# Patient Record
(demographics unavailable — no encounter records)

---

## 2025-04-02 NOTE — REASON FOR VISIT
[Initial - Scheduled] : an initial, scheduled visit with concerns of [Other: ____] : [unfilled] [Patient] : patient [Mother] : mother [Family Member] : family member

## 2025-04-02 NOTE — HISTORY OF PRESENT ILLNESS
[FreeTextEntry1] : Marlee is a 10 year old female with a history of a cloaca s/p colostomy and mucus fistula creation for diversion and vaginostomy for evacuation of urine; surgery done in [place]. She was admitted to [facility] three months ago with abdominal pain. Since then, she has been doing well at home with occasional abdominal pain when she is active, but no significant issues reported. Mom has been managing the ostomy site well at home and denies any issues with the appliance. She is having a good output from the site as well as from the vaginostomy. Mom reports some occasional leakage from the vagina, but not from her anus. Marlee is tolerating her meals well without emesis. To note, Marlee had a VACTERL workup performed in [place] was reportedly normal. She is otherwise healthy and the family presents today to discuss surgical options moving forward.

## 2025-04-02 NOTE — ADDENDUM
[FreeTextEntry1] : Documented by Carlos Buckner acting as a scribe for Dr. Arreguin on 04/02/2025.   All medical record entries made by the Scribe were at my, Dr. Arreguin, direction and personally dictated by me on 04/02/2025. I have reviewed the chart and agree that the record accurately reflects my personal performances of the history, physical exam, assessment and plan. I have also personally directed, reviewed, and agree with the instructions.

## 2025-04-02 NOTE — CONSULT LETTER
[Dear  ___] : Dear  [unfilled], [Consult Letter:] : I had the pleasure of evaluating your patient, [unfilled]. [Please see my note below.] : Please see my note below. [Consult Closing:] : Thank you very much for allowing me to participate in the care of this patient.  If you have any questions, please do not hesitate to contact me. [Sincerely,] : Sincerely, [FreeTextEntry3] : Cy Arreguin MD Division of Pediatric, General, Thoracic and Endoscopic Surgery [facility]

## 2025-04-02 NOTE — ASSESSMENT
[FreeTextEntry1] : Marlee is a 10 year old female with a history of a cloaca s/p colostomy, mucus fistula creation, and vaginostomy performed in [place]. She is doing well despite the occasional abdominal pain and the family has been managing the ostomy and vaginostomy well at home without issues. I counseled the family and began by reviewing the treatment plan ahead including surgery to address Marlee's cloaca and ostomy in the future. However, before we can proceed with surgery, I explained to mom that I would like to start with an MRI, followed by an EUA to further examine Marlee's anatomy, which they understand will help guide the route for the procedures. I discussed the two studies and what they entail. The family verbalized their understanding and agreed to move forward with the recommended studies. We will schedule the MRI in the near future and will follow up afterwards to discuss the results and next steps.  The family has my information and knows to contact me sooner with any questions or concerns.

## 2025-04-02 NOTE — PHYSICAL EXAM
[NL] : grossly intact [TextBox_37] : Colostomy is prolapsed and functioning well with a good output, vaginostomy intact with adequate drainage, no peristomal skin breakdown

## 2025-06-19 NOTE — ASSESSMENT
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 She underwent an EUA and cystovaginoscopy for operative planning for revision of a known persistent cloaca and hydrocolpos. She is doing well overall and I reassured the family that the pain Marlee is experiences is within normal expectations following the exam Marlee underwent and the manipulation of the region. These will likely resolve in the near future. I then discussed our plan to address Marlee's cloacal malformation via surgical reconstruction of her anatomy and what the procedure entails. We reviewed Marlee's anatomy in detail and the goal to separate the vaginal, urethral, and anal openings. They understand the plan to first complete a lithotripsy that is being facilitated by Dr. Gitlin of the urology team before we can move forward with surgery. We will touch base after the procedure to discuss the next steps. The family has my information and knows to contact me sooner with any questions or concerns.

## 2025-06-19 NOTE — CONSULT LETTER
[Dear  ___] : Dear  [unfilled], [Consult Letter:] : I had the pleasure of evaluating your patient, [unfilled]. [Please see my note below.] : Please see my note below. [Consult Closing:] : Thank you very much for allowing me to participate in the care of this patient.  If you have any questions, please do not hesitate to contact me. [Sincerely,] : Sincerely, [FreeTextEntry3] : Cy Arreguin MD Division of Pediatric, General, Thoracic and Endoscopic Surgery [facility]

## 2025-06-19 NOTE — REASON FOR VISIT
[____ Week(s)] : [unfilled] week(s)  [Other: ____] : [unfilled] [Patient] : patient [Mother] : mother [Family Member] : family member [de-identified] : 06/13/2025 [de-identified] : Dr. 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 She underwent an EUA and cystovaginoscopy for operative planning for revision of a known persistent cloaca and hydrocolpos. Operative findings include long common channel with large stones visualized at the distal limb of the loop colostomy. Distinct anatomy of the bladder, vagina and rectum were not visualized. Following the procedure, she underwent an MRI which showed the distal colon communicating to the right hemivagina. There was a fluid filled structure situated posteriorly to the urinary bladder, likely representing 2 hemivaginas containing a large amount of fluid, communicating to the mucous fistula.  Additionally, the right kidney appeared malroated with mild hydronephritis; prophylactic Keflex initiated. Most recently, Marlee continues to experience some lower back and abdominal pain. She is otherwise eating well without emesis. No fevers or changes in energy levels. She is also experiencing some vaginal pain upon urination. To note, Marlee has sensation for urine and is able to urinate in the toilet and withhold if necessary.

## 2025-06-19 NOTE — ADDENDUM
[FreeTextEntry1] : Documented by Carlos Buckner acting as a scribe for Dr. Arreguin on 06/19/2025.   All medical record entries made by the Scribe were at my, Dr. Arreguin, direction and personally dictated by me on 06/19/2025. I have reviewed the chart and agree that the record accurately reflects my personal performances of the history, physical exam, assessment and plan. I have also personally directed, reviewed, and agree with the instructions.

## 2025-06-19 NOTE — ASSESSMENT
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 She underwent an EUA and cystovaginoscopy for operative planning for revision of a known persistent cloaca and hydrocolpos. She is doing well overall and I reassured the family that the pain Marlee is experiences is within normal expectations following the exam Marlee underwent and the manipulation of the region. These will likely resolve in the near future. I then discussed our plan to address Marlee's cloacal malformation via surgical reconstruction of her anatomy and what the procedure entails. We reviewed Marlee's anatomy in detail and the goal to separate the vaginal, urethral, and anal openings. They understand the plan to first complete a lithotripsy that is being facilitated by Dr. Gitlin of the urology team before we can move forward with surgery. We will touch base after the procedure to discuss the next steps. The family has my information and knows to contact me sooner with any questions or concerns.

## 2025-06-19 NOTE — PHYSICAL EXAM
[NL] : grossly intact [TextBox_37] : Stoma is intact and functioning without peristomal skin breakdown or leakage

## 2025-06-19 NOTE — REASON FOR VISIT
[____ Week(s)] : [unfilled] week(s)  [Other: ____] : [unfilled] [Patient] : patient [Mother] : mother [Family Member] : family member [de-identified] : 06/13/2025 [de-identified] : Dr. 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 She underwent an EUA and cystovaginoscopy for operative planning for revision of a known persistent cloaca and hydrocolpos. Operative findings include long common channel with large stones visualized at the distal limb of the loop colostomy. Distinct anatomy of the bladder, vagina and rectum were not visualized. Following the procedure, she underwent an MRI which showed the distal colon communicating to the right hemivagina. There was a fluid filled structure situated posteriorly to the urinary bladder, likely representing 2 hemivaginas containing a large amount of fluid, communicating to the mucous fistula.  Additionally, the right kidney appeared malroated with mild hydronephritis; prophylactic Keflex initiated. Most recently, Marlee continues to experience some lower back and abdominal pain. She is otherwise eating well without emesis. No fevers or changes in energy levels. She is also experiencing some vaginal pain upon urination. To note, Marlee has sensation for urine and is able to urinate in the toilet and withhold if necessary.

## 2025-06-19 NOTE — CONSULT LETTER
[Dear  ___] : Dear  [unfilled], [Consult Letter:] : I had the pleasure of evaluating your patient, [unfilled]. [Please see my note below.] : Please see my note below. [Consult Closing:] : Thank you very much for allowing me to participate in the care of this patient.  If you have any questions, please do not hesitate to contact me. [Sincerely,] : Sincerely, [FreeTextEntry3] : Cy Arreguin MD Division of Pediatric, General, Thoracic and Endoscopic Surgery [facility]

## 2025-07-06 NOTE — HISTORY OF PRESENT ILLNESS
[FreeTextEntry1] : 10-year-old girl Accompanied by mother and cousin Cloacal malformation status post multiple surgeries Referred by Dr. Jordan Gitlin to evaluate for possible joint surgery to treat stone within bladder versus vagina CT scan images reviewed, pertinent findings discussed with patient's mother and cousin She currently has an indwelling suprapubic tube on the right side of the abdomen and left-sided colostomy

## 2025-07-06 NOTE — ASSESSMENT
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 Gitlin (pediatric urology) Will most likely necessitate pelvic and percutaneous abdominal approaches to be able to reach the stones Risks of infection, sepsis requiring ICU stay, hematuria, hemorrhage requiring conversion to open exploration, bladder perforation, urethral injury, ureteral injury, incomplete clearance of fragments, adjacent organ injury, especially bowel injury, vascular injury, urinary retention, clot retention, bladder spasms, pain, anesthetic complications, cardiac and pulmonary complications requiring ICU care.  We will coordinate scheduling with Dr. Gitlin